# Patient Record
Sex: FEMALE | Race: BLACK OR AFRICAN AMERICAN | URBAN - METROPOLITAN AREA
[De-identification: names, ages, dates, MRNs, and addresses within clinical notes are randomized per-mention and may not be internally consistent; named-entity substitution may affect disease eponyms.]

---

## 2021-05-17 ENCOUNTER — OFFICE VISIT (OUTPATIENT)
Dept: ORTHOPEDIC SURGERY | Age: 31
End: 2021-05-17
Payer: COMMERCIAL

## 2021-05-17 VITALS
SYSTOLIC BLOOD PRESSURE: 123 MMHG | RESPIRATION RATE: 18 BRPM | TEMPERATURE: 98 F | DIASTOLIC BLOOD PRESSURE: 80 MMHG | BODY MASS INDEX: 29.09 KG/M2 | HEART RATE: 75 BPM | OXYGEN SATURATION: 99 % | HEIGHT: 66 IN | WEIGHT: 181 LBS

## 2021-05-17 DIAGNOSIS — S39.012S STRAIN OF LUMBAR REGION, SEQUELA: ICD-10-CM

## 2021-05-17 DIAGNOSIS — M54.50 LOW BACK PAIN, UNSPECIFIED BACK PAIN LATERALITY, UNSPECIFIED CHRONICITY, UNSPECIFIED WHETHER SCIATICA PRESENT: Primary | ICD-10-CM

## 2021-05-17 DIAGNOSIS — M54.50 LUMBAR PAIN: ICD-10-CM

## 2021-05-17 PROCEDURE — 72100 X-RAY EXAM L-S SPINE 2/3 VWS: CPT | Performed by: PHYSICAL MEDICINE & REHABILITATION

## 2021-05-17 PROCEDURE — 99214 OFFICE O/P EST MOD 30 MIN: CPT | Performed by: PHYSICAL MEDICINE & REHABILITATION

## 2021-05-17 RX ORDER — GALCANEZUMAB 120 MG/ML
INJECTION, SOLUTION SUBCUTANEOUS
COMMUNITY
Start: 2021-05-13

## 2021-05-17 RX ORDER — CETIRIZINE HCL 10 MG
TABLET ORAL
COMMUNITY
Start: 2021-04-13

## 2021-05-17 RX ORDER — NAPROXEN 500 MG/1
500 TABLET ORAL 2 TIMES DAILY WITH MEALS
Qty: 30 TAB | Refills: 0 | Status: SHIPPED | OUTPATIENT
Start: 2021-05-17

## 2021-05-17 RX ORDER — IBUPROFEN 600 MG/1
TABLET ORAL
COMMUNITY
Start: 2021-05-07

## 2021-05-17 RX ORDER — METHYLPREDNISOLONE 4 MG/1
TABLET ORAL
Qty: 1 DOSE PACK | Refills: 0 | Status: SHIPPED | OUTPATIENT
Start: 2021-05-17

## 2021-05-17 RX ORDER — METHOCARBAMOL 750 MG/1
TABLET, FILM COATED ORAL 4 TIMES DAILY
COMMUNITY

## 2021-05-17 NOTE — LETTER
5/17/2021 Patient: Fab Coelho YOB: 1990 Date of Visit: 5/17/2021 Yoko Julio MD 
[address] [place] [address] Via Fax: [phone] Dear Yoko Julio MD, Thank you for referring Ms. Fab Coelho to [facility] [address] for evaluation. My notes for this consultation are attached. If you have questions, please do not hesitate to call me. I look forward to following your patient along with you. Sincerely, Kaela Adam MD
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
## 2021-06-28 ENCOUNTER — OFFICE VISIT (OUTPATIENT)
Dept: ORTHOPEDIC SURGERY | Age: 31
End: 2021-06-28
Payer: MEDICAID

## 2021-06-28 VITALS
RESPIRATION RATE: 18 BRPM | WEIGHT: 181.4 LBS | OXYGEN SATURATION: 96 % | HEART RATE: 88 BPM | TEMPERATURE: 97.9 F | BODY MASS INDEX: 30.22 KG/M2 | HEIGHT: 65 IN

## 2021-06-28 DIAGNOSIS — M54.50 LUMBAR PAIN: ICD-10-CM

## 2021-06-28 DIAGNOSIS — M54.50 LOW BACK PAIN, UNSPECIFIED BACK PAIN LATERALITY, UNSPECIFIED CHRONICITY, UNSPECIFIED WHETHER SCIATICA PRESENT: Primary | ICD-10-CM

## 2021-06-28 DIAGNOSIS — S39.012S STRAIN OF LUMBAR REGION, SEQUELA: ICD-10-CM

## 2021-06-28 PROCEDURE — 99213 OFFICE O/P EST LOW 20 MIN: CPT | Performed by: PHYSICAL MEDICINE & REHABILITATION

## 2021-06-28 NOTE — LETTER
6/28/2021    Patient: Karissa Ross   YOB: 1990   Date of Visit: 6/28/2021     Reena Romero MD  [address]  Via Fax: [phone]    Dear Reena Romero MD,      Thank you for referring Ms. Karissa Ross to [facility] [address] for evaluation. My notes for this consultation are attached. If you have questions, please do not hesitate to call me. I look forward to following your patient along with you.       Sincerely,    Evelyn Allen MD
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
## 2024-01-10 ENCOUNTER — OFFICE VISIT (OUTPATIENT)
Age: 34
End: 2024-01-10
Payer: MEDICAID

## 2024-01-10 VITALS — WEIGHT: 194 LBS | BODY MASS INDEX: 32.32 KG/M2 | HEIGHT: 65 IN

## 2024-01-10 DIAGNOSIS — G56.01 RIGHT CARPAL TUNNEL SYNDROME: Primary | ICD-10-CM

## 2024-01-10 DIAGNOSIS — M79.641 RIGHT HAND PAIN: ICD-10-CM

## 2024-01-10 PROCEDURE — 73130 X-RAY EXAM OF HAND: CPT | Performed by: ORTHOPAEDIC SURGERY

## 2024-01-10 PROCEDURE — 99213 OFFICE O/P EST LOW 20 MIN: CPT | Performed by: ORTHOPAEDIC SURGERY

## 2024-01-10 NOTE — PROGRESS NOTES
Andreina Talley is a 33 y.o. female right handed [employer] employee.  Worker's Compensation and legal considerations: none    Chief Complaint   Patient presents with    Hand Pain     Right hand pain     Pain Score:   2    HPI: patient today with complaints of right hand pain.  She also reports numbness and tingling in the hand that occurs at nighttime.    Date of onset:  indeterminate  Injury: No  Prior Treatment:  No    ROS: Review of Systems - General ROS: negative except HPI    Past Medical History:   Diagnosis Date    Migraines        Past Surgical History:   Procedure Laterality Date    APPENDECTOMY  2014    OTHER SURGICAL HISTORY      tubaligation        Current Outpatient Medications   Medication Sig Dispense Refill    albuterol sulfate HFA (VENTOLIN HFA) 108 (90 Base) MCG/ACT inhaler ceived the following from [facility]: Outside name: VENTOLIN HFA 90 mcg/actuation inhaler      cetirizine (ZYRTEC) 10 MG tablet TAKE 1 TABLET BY MOUTH EVERY DAY      Galcanezumab-gnlm (EMGALITY) 120 MG/ML SOSY INJECT 1 SYRINGE UNDER THE SKIN IN THE THIGH OR ABDOMINAL MONTHLY AS DIRECTED      ibuprofen (ADVIL;MOTRIN) 600 MG tablet TAKE 1 TABLET BY MOUTH EVERY 8 HOURS AS NEEDED      methocarbamol (ROBAXIN) 750 MG tablet Take by mouth 4 times daily      methylPREDNISolone (MEDROL DOSEPACK) 4 MG tablet Per dose pack instructions      naproxen (NAPROSYN) 500 MG tablet Take 500 mg by mouth 2 times daily (with meals)      sodium chloride (OCEAN) 0.65 % nasal spray ceived the following from [facility]: Outside name: SALINE NASAL 0.65 % nasal spray       No current facility-administered medications for this visit.       Allergies   Allergen Reactions    Shellfish-Derived Products Anaphylaxis    Codeine Hives         Ht 1.651 m (5' 5\")   Wt 88 kg (194 lb)   BMI 32.28 kg/m²   Physical Exam  Vitals and nursing note reviewed.   Constitutional:       General: She is not in acute distress.     Appearance:

## 2024-01-11 ENCOUNTER — PROCEDURE VISIT (OUTPATIENT)
Age: 34
End: 2024-01-11
Payer: MEDICAID

## 2024-01-11 VITALS
HEART RATE: 93 BPM | WEIGHT: 193 LBS | OXYGEN SATURATION: 99 % | SYSTOLIC BLOOD PRESSURE: 123 MMHG | BODY MASS INDEX: 32.15 KG/M2 | HEIGHT: 65 IN | DIASTOLIC BLOOD PRESSURE: 73 MMHG

## 2024-01-11 DIAGNOSIS — M79.641 RIGHT HAND PAIN: Primary | ICD-10-CM

## 2024-01-11 PROCEDURE — 95909 NRV CNDJ TST 5-6 STUDIES: CPT | Performed by: PHYSICAL MEDICINE & REHABILITATION

## 2024-01-11 PROCEDURE — 95886 MUSC TEST DONE W/N TEST COMP: CPT | Performed by: PHYSICAL MEDICINE & REHABILITATION

## 2024-01-11 NOTE — PROGRESS NOTES
[facility]  [facility] [facility], [address]  [place] [address]  Phone: [phone]  Fax: [phone]    Andreina Talley  : 1990  PCP: Rosanne Gaspar MD  2024    ELECTROMYOGRAPHY AND NERVE CONDUCTION STUDIES    Andreina Talley was referred by Dr. Alvarez for electrodiagnostic evaluation of pain of RUE.     NCV & EMG Findings:  All nerve conduction studies (as indicated in the following tables) were within normal limits.    INTERPRETATION  This was a normal nerve conduction and EMG study showing there to be no signs of neuropathy, myopathy, or radiculopathy in the nerves and muscles tested.       CLINICAL INTERPRETATION  Her electrodiagnostic findings do not appear to explain her right hand symptoms.      HISTORY OF PRESENT ILLNESS  Andreina Talley is a 33 y.o. female.    Pt presents today with RUE EMG evaluation for pain of right hand.    PAST MEDICAL HISTORY   Past Medical History:   Diagnosis Date    Migraines        Past Surgical History:   Procedure Laterality Date    APPENDECTOMY      OTHER SURGICAL HISTORY      tubaligation   .      MEDICATIONS    Current Outpatient Medications   Medication Sig Dispense Refill    albuterol sulfate HFA (VENTOLIN HFA) 108 (90 Base) MCG/ACT inhaler ceived the following from [facility]: Outside name: VENTOLIN HFA 90 mcg/actuation inhaler      cetirizine (ZYRTEC) 10 MG tablet TAKE 1 TABLET BY MOUTH EVERY DAY      Galcanezumab-gnlm (EMGALITY) 120 MG/ML SOSY INJECT 1 SYRINGE UNDER THE SKIN IN THE THIGH OR ABDOMINAL MONTHLY AS DIRECTED      ibuprofen (ADVIL;MOTRIN) 600 MG tablet TAKE 1 TABLET BY MOUTH EVERY 8 HOURS AS NEEDED      methocarbamol (ROBAXIN) 750 MG tablet Take by mouth 4 times daily      methylPREDNISolone (MEDROL DOSEPACK) 4 MG tablet Per dose pack instructions      naproxen (NAPROSYN) 500 MG tablet Take 500 mg by mouth 2 times daily (with meals)      sodium chloride (OCEAN)

## 2024-01-17 ENCOUNTER — OFFICE VISIT (OUTPATIENT)
Age: 34
End: 2024-01-17
Payer: MEDICAID

## 2024-01-17 VITALS — BODY MASS INDEX: 31.32 KG/M2 | WEIGHT: 188 LBS | HEIGHT: 65 IN

## 2024-01-17 DIAGNOSIS — M25.531 RIGHT WRIST PAIN: Primary | ICD-10-CM

## 2024-01-17 DIAGNOSIS — M79.641 RIGHT HAND PAIN: ICD-10-CM

## 2024-01-17 DIAGNOSIS — M62.89 MUSCLE FATIGUE: ICD-10-CM

## 2024-01-17 PROCEDURE — 99214 OFFICE O/P EST MOD 30 MIN: CPT

## 2024-01-17 NOTE — PROGRESS NOTES
Andreina Talley is a 33 y.o. female right handed [employer] employee.  Worker's Compensation and legal considerations: none    Chief Complaint   Patient presents with    Hand Pain     Right hand pain     Pain Score:   0 - No pain    1/17/2024 HPI: Patient presents today for a EMG follow up. She has been out of work since before thanksgiving. She reports no pain today, but she has intermittent pain throughout the day even if she is just sitting at home. She denies numbness or tingling today.    Initial HPI: patient today with complaints of right hand pain.  She also reports numbness and tingling in the hand that occurs at nighttime.    Date of onset:  indeterminate  Injury: No  Prior Treatment:  No    ROS: Review of Systems - General ROS: negative except HPI    Past Medical History:   Diagnosis Date    Migraines        Past Surgical History:   Procedure Laterality Date    APPENDECTOMY  2014    OTHER SURGICAL HISTORY      tubaligation        Current Outpatient Medications   Medication Sig Dispense Refill    albuterol sulfate HFA (VENTOLIN HFA) 108 (90 Base) MCG/ACT inhaler ceived the following from [facility]: Outside name: VENTOLIN HFA 90 mcg/actuation inhaler      cetirizine (ZYRTEC) 10 MG tablet TAKE 1 TABLET BY MOUTH EVERY DAY      Galcanezumab-gnlm (EMGALITY) 120 MG/ML SOSY INJECT 1 SYRINGE UNDER THE SKIN IN THE THIGH OR ABDOMINAL MONTHLY AS DIRECTED      ibuprofen (ADVIL;MOTRIN) 600 MG tablet TAKE 1 TABLET BY MOUTH EVERY 8 HOURS AS NEEDED      methocarbamol (ROBAXIN) 750 MG tablet Take by mouth 4 times daily      methylPREDNISolone (MEDROL DOSEPACK) 4 MG tablet Per dose pack instructions      naproxen (NAPROSYN) 500 MG tablet Take 1 tablet by mouth 2 times daily (with meals)      sodium chloride (OCEAN) 0.65 % nasal spray ceived the following from [facility]: Outside name: SALINE NASAL 0.65 % nasal spray       No current facility-administered medications for this visit.

## 2024-01-24 ENCOUNTER — CLINICAL DOCUMENTATION (OUTPATIENT)
Age: 34
End: 2024-01-24

## 2024-01-25 ENCOUNTER — TELEPHONE (OUTPATIENT)
Age: 34
End: 2024-01-25

## 2024-01-25 NOTE — TELEPHONE ENCOUNTER
Patient called for either  or MARCIANO Cotter.    Patient said she needs a New Letter that would state that she was placed out of work from 01/13/2024 until present day. That they want to know why she was out of work. That they need the letter in addition to the letter she received yesterday from [employer]CIANO Cotter.    Patient said that the note should state that she was out due to her Hand Situation. Patient said she needs the note for her Disability.    Would like to  from the [facility] location.     Patient tel. [phone].    Note: patient was seen for the Right Hand Wrist by MARCIANO Cotter.

## 2024-01-26 NOTE — TELEPHONE ENCOUNTER
Ok in that situation, I would write a note saying the previous restrictions resulted in patient needing to be out of work due to inability for employer to accommodate said restrictions.

## 2024-01-26 NOTE — TELEPHONE ENCOUNTER
Unfortunately if we never agreed to keep her completely out of work or documented that we agreed to this we can’t go back and change this.  Al we can do is give her what we’ve documented.  Is this the first we’ve heard of her being completely out of work?

## 2024-04-03 ENCOUNTER — CLINICAL DOCUMENTATION (OUTPATIENT)
Age: 34
End: 2024-04-03

## 2024-04-03 NOTE — PROGRESS NOTES
Patient dropped off disability paperwork at [facility] to be filled out and completed. Once completed patient would like to be contacted at [phone]. Form was scanned into chart.

## 2024-04-05 ENCOUNTER — CLINICAL DOCUMENTATION (OUTPATIENT)
Age: 34
End: 2024-04-05

## 2024-04-17 ENCOUNTER — OFFICE VISIT (OUTPATIENT)
Age: 34
End: 2024-04-17
Payer: MEDICAID

## 2024-04-17 VITALS — BODY MASS INDEX: 30.82 KG/M2 | WEIGHT: 185 LBS | HEIGHT: 65 IN

## 2024-04-17 DIAGNOSIS — M25.531 RIGHT WRIST PAIN: ICD-10-CM

## 2024-04-17 DIAGNOSIS — M79.641 RIGHT HAND PAIN: ICD-10-CM

## 2024-04-17 DIAGNOSIS — M62.89 MUSCLE FATIGUE: Primary | ICD-10-CM

## 2024-04-17 PROCEDURE — 99213 OFFICE O/P EST LOW 20 MIN: CPT

## 2024-04-17 NOTE — PROGRESS NOTES
Andreina Talley is a 33 y.o. female right handed [employer] employee.  Worker's Compensation and legal considerations: none    Chief Complaint   Patient presents with    Hand Pain     Right       Pain Score:   0 - No pain    4/17/2024 HPI: Patient presents today for regularly scheduled 3-month follow-up on her right hand and wrist pain.  She denies pain today.  She reports pain is worse with repetitive motion at work and worse at the end of the day.  She denies numbness or tingling today.  She has been in occupational therapy.  She does report therapy is improving her symptoms and she feels like her hands are getting stronger. She feels that her symptoms are occupation related.    1/17/2024 HPI: Patient presents today for a EMG follow up. She has been out of work since before thanksgiving. She reports no pain today, but she has intermittent pain throughout the day even if she is just sitting at home. She denies numbness or tingling today.    Initial HPI: patient today with complaints of right hand pain.  She also reports numbness and tingling in the hand that occurs at nighttime.    Date of onset:  indeterminate  Injury: No  Prior Treatment:  No    ROS: Review of Systems - General ROS: negative except HPI    Past Medical History:   Diagnosis Date    Migraines        Past Surgical History:   Procedure Laterality Date    APPENDECTOMY  2014    OTHER SURGICAL HISTORY      tubaligation        Current Outpatient Medications   Medication Sig Dispense Refill    albuterol sulfate HFA (VENTOLIN HFA) 108 (90 Base) MCG/ACT inhaler ceived the following from [facility]: Outside name: VENTOLIN HFA 90 mcg/actuation inhaler      cetirizine (ZYRTEC) 10 MG tablet TAKE 1 TABLET BY MOUTH EVERY DAY      Galcanezumab-gnlm (EMGALITY) 120 MG/ML SOSY INJECT 1 SYRINGE UNDER THE SKIN IN THE THIGH OR ABDOMINAL MONTHLY AS DIRECTED      ibuprofen (ADVIL;MOTRIN) 600 MG tablet TAKE 1 TABLET BY MOUTH EVERY 8 HOURS AS NEEDED

## 2024-07-17 ENCOUNTER — OFFICE VISIT (OUTPATIENT)
Age: 34
End: 2024-07-17
Payer: MEDICAID

## 2024-07-17 VITALS — HEIGHT: 65 IN | BODY MASS INDEX: 29.85 KG/M2 | WEIGHT: 179.2 LBS

## 2024-07-17 DIAGNOSIS — M79.641 RIGHT HAND PAIN: ICD-10-CM

## 2024-07-17 DIAGNOSIS — M62.89 MUSCLE FATIGUE: Primary | ICD-10-CM

## 2024-07-17 DIAGNOSIS — M25.531 RIGHT WRIST PAIN: ICD-10-CM

## 2024-07-17 PROCEDURE — 99213 OFFICE O/P EST LOW 20 MIN: CPT

## 2024-07-17 NOTE — PROGRESS NOTES
Andreina Talley is a 33 y.o. female right handed [employer] employee.  Worker's Compensation and legal considerations: none    Chief Complaint   Patient presents with    Follow-up     3 month follow for right hand      Pain Score:   0 - No pain    7/17/2024 HPI: Patient presents today for her regularly scheduled 3-month up on her right hand and wrist pain.  She denies any pain at the current time.  She does report that while she is working at [employer], her hand does occasionally cramp up after long stent of working and takes approximately 10 minutes to feel better.  Reports this is unpredictable.  She continues to endorse that her symptoms are  occupation related but that she is starting a different job very soon. Outside of work, she denies any right hand or wrist symptoms.      4/17/2024 HPI: Patient presents today for regularly scheduled 3-month follow-up on her right hand and wrist pain.  She denies pain today.  She reports pain is worse with repetitive motion at work and worse at the end of the day.  She denies numbness or tingling today.  She has been in occupational therapy.  She does report therapy is improving her symptoms and she feels like her hands are getting stronger. She feels that her symptoms are occupation related.    1/17/2024 HPI: Patient presents today for a EMG follow up. She has been out of work since before thanksgiving. She reports no pain today, but she has intermittent pain throughout the day even if she is just sitting at home. She denies numbness or tingling today.    Initial HPI: patient today with complaints of right hand pain.  She also reports numbness and tingling in the hand that occurs at nighttime.    Date of onset:  indeterminate  Injury: No  Prior Treatment:  No    ROS: Review of Systems - General ROS: negative except HPI    Past Medical History:   Diagnosis Date    Migraines        Past Surgical History:   Procedure Laterality Date    APPENDECTOMY  2014    OTHER SURGICAL

## 2025-06-11 ENCOUNTER — OFFICE VISIT (OUTPATIENT)
Age: 35
End: 2025-06-11
Payer: COMMERCIAL

## 2025-06-11 VITALS — WEIGHT: 184 LBS | HEIGHT: 65 IN | BODY MASS INDEX: 30.66 KG/M2 | TEMPERATURE: 97 F

## 2025-06-11 DIAGNOSIS — M54.16 LUMBAR NEURITIS: ICD-10-CM

## 2025-06-11 DIAGNOSIS — M54.50 LUMBAR PAIN: Primary | ICD-10-CM

## 2025-06-11 PROCEDURE — 72110 X-RAY EXAM L-2 SPINE 4/>VWS: CPT | Performed by: PHYSICAL MEDICINE & REHABILITATION

## 2025-06-11 PROCEDURE — 99204 OFFICE O/P NEW MOD 45 MIN: CPT | Performed by: PHYSICAL MEDICINE & REHABILITATION

## 2025-06-11 RX ORDER — METHYLPREDNISOLONE 4 MG/1
TABLET ORAL
Qty: 1 KIT | Refills: 0 | Status: SHIPPED | OUTPATIENT
Start: 2025-06-11
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
